# Patient Record
Sex: MALE | Race: WHITE | Employment: FULL TIME | URBAN - METROPOLITAN AREA
[De-identification: names, ages, dates, MRNs, and addresses within clinical notes are randomized per-mention and may not be internally consistent; named-entity substitution may affect disease eponyms.]

---

## 2017-10-02 ENCOUNTER — OFFICE VISIT (OUTPATIENT)
Dept: ORTHOPEDIC SURGERY | Age: 42
End: 2017-10-02

## 2017-10-02 VITALS
DIASTOLIC BLOOD PRESSURE: 76 MMHG | WEIGHT: 208 LBS | SYSTOLIC BLOOD PRESSURE: 133 MMHG | BODY MASS INDEX: 29.78 KG/M2 | HEIGHT: 70 IN

## 2017-10-02 DIAGNOSIS — M25.562 ACUTE PAIN OF LEFT KNEE: Primary | ICD-10-CM

## 2017-10-02 PROCEDURE — 99204 OFFICE O/P NEW MOD 45 MIN: CPT | Performed by: ORTHOPAEDIC SURGERY

## 2017-10-02 PROCEDURE — 20610 DRAIN/INJ JOINT/BURSA W/O US: CPT | Performed by: ORTHOPAEDIC SURGERY

## 2017-10-02 NOTE — PROGRESS NOTES
inches from the floor      Strength: Quadriceps testing 5/5 , hamstring muscle testing 5/5, EHL against resistance is 5/5, hip flexor strength is intact 5/5, internal and external rotation of the hip against resistance is also intact 5/5    Special Tests: stable Lachman, negative anterior drawer, negative pivot shift, no posterior sag no posterior drawer does not open to valgus or varus stress at 0 or 30° negative, Steinmann's negative, Pearl's negative, Homans negative Zachary, pedal pulses are +2/4 capillary refill is brisk sensation is intact ankle exam and hip exam are shows no pain with full range of motion provocative testing of the hip is nonpainful muscle testing around the hip is 5 over 5. Lumbar flexion to 6 inches from floor with out pain    Gait: antalgic     Reflex:    Lower extremity Deep tendon reflexes +2/4 and equal bilaterally for patella and Achilles  Upper extremity reflexes:  of the biceps, triceps, brachioradialis +2/4 equal bilaterally    Contralateral  Knee: Negative Lachman negative anterior drawer negative pivot shift no posterior sag no posterior drawer does not open to valgus or varus stress at 0 or 30° negative Steinmann's negative Pearl's negative Homans negative Zachary pedal pulses are +2/4 capillary refill is brisk sensation is intact ankle exam and hip exam are shows no pain with full range of motion provocative testing of the hip is nonpainful muscle testing around the hip is 5 over 5.       Lumbar exam:    L1: good strength of the iliopsoas muscle upper lateral thigh sensation is intact  L2: good strength of the iliopsoas muscle and medial epicondyle sensation is intact Lateral thigh sensation is intact  L3: good strength with out pain of obturator externus muscle sensation is intact to medial epicondyle of the femur   L4:Good quadratus strength and gluteus medius and minimus strength, sensation is intact to medial malleolus  L5:Intact Extensor hallucis and tibialis posterior

## 2017-10-16 ENCOUNTER — OFFICE VISIT (OUTPATIENT)
Dept: ORTHOPEDIC SURGERY | Age: 42
End: 2017-10-16

## 2017-10-16 VITALS — WEIGHT: 208 LBS | HEIGHT: 70 IN | BODY MASS INDEX: 29.78 KG/M2

## 2017-10-16 DIAGNOSIS — S83.232D COMPLEX TEAR OF MEDIAL MENISCUS OF LEFT KNEE AS CURRENT INJURY, SUBSEQUENT ENCOUNTER: Primary | ICD-10-CM

## 2017-10-16 PROBLEM — S83.232A COMPLEX TEAR OF MEDIAL MENISCUS OF LEFT KNEE AS CURRENT INJURY: Status: ACTIVE | Noted: 2017-10-16

## 2017-10-16 PROCEDURE — 99213 OFFICE O/P EST LOW 20 MIN: CPT | Performed by: ORTHOPAEDIC SURGERY

## 2017-10-16 NOTE — PROGRESS NOTES
History of Present Illness:  Everardo Shukla is a 38 y.o. male    Location recheck evaluation left Knee   Severity moderate  Duration several months now  Associated sign/symptoms pain mostly medial    Medical History  Patient's medications, allergies, past medical, surgical, social and family histories were reviewed and updated as appropriate. Review of Systems  Pertinent items are noted in HPI  Review of systems reviewed from Patient History Form dated on 10-2-17 and available in the patient's chart under the Media tab. No change in the patients medical history form. Examination:  General Exam:    Vitals: Height 5' 10\" (1.778 m), weight 208 lb (94.3 kg). Constitutional: Patient is adequately groomed with no evidence of malnutrition  Mental Status: The patient is alert and  oriented to time, place and person. The patient's mood and affect are appropriate. Lymphatic: The lymphatic examination bilaterally reveals all areas to be without enlargement or induration. Vascular: Examination reveals no swelling or calf tenderness. Peripheral pulses are palpable and 2+. Neurological: The patient has good coordination. There is no weakness or sensory deficit. Skin:    Head/Neck: inspection reveals no rashes, ulcerations or lesions. Trunk:  inspection reveals no rashes, ulcerations or lesions. Right Lower Extremity: inspection reveals no rashes, ulcerations or lesions. Left Lower Extremity: inspection reveals no rashes, ulcerations or lesions.                                           PHYSICAL EXAM:        Knee Examination  Inspection:  No abrasions no lacerations no signs of infection or obvious deformity moderate obvious  swelling and joint effusion     Palpation:   Palpation reveals severe  Pain medial joint line,   No lateral joint line pain,  moderate joint effusion    Range of Motion:  0 - 150° flexion/ extension   Hip extension to 20 hip flexion to 70+  Lumbar ROM -20 extension flexion to 6 inches or injury. Range of motion is unremarkable. There is no gross instability. There are no rashes, ulcerations or lesions. Strength and tone are normal.  Lower Back: Examination of the lower back does not show any tenderness, deformity or injury. Range of motion is unremarkable. There is no gross instability. There are no rashes, ulcerations or lesions. Strength and tone are normal.    Past Surgical History:   Procedure Laterality Date    CORONARY ANGIOPLASTY WITH STENT PLACEMENT      X 2    HERNIA REPAIR      X 2    VASECTOMY      VASECTOMY REVERSAL     . Assessment :  Left knee medial meniscus tear    Impression: Left knee medial meniscus tear    Office Procedures:  No orders of the defined types were placed in this encounter. Previous Treatments:  X-ray, MRI, injections,    Possible other diagnoses:      Treatment Plan:  I spent 15+ minutes, face to face, with the patient discussing and answering questions regarding the risks, benefits, and complications of arthroscopic knee surgery. The patient realizes that there are concerns with this surgery with respect to infection, deep vein thrombosis, neurological injury, delayed  rehabilitation, the possibility of arthrofibrosis of the knee, and specifically  Hoffa's fat pad fibrosis that can potentially cause difficulties. The patient realizes that there are also anesthetic concerns including cardiopulmonary issues, pulmonary issues, and even possibility of death or dystrophy. The patient voiced understanding to this as well as the normal  rehabilitation  that   is involved with weeks of physical therapy, exercise, and strengthening. The patient also realizes that even though the surgery, from a functional perspective, typically allows the patient to return to good function at about 6 weeks, that it often takes 6 months to completely rehabilitate from this operation.  The patient also realizes that if there is an arthritic component to the symptoms,

## 2017-11-09 ENCOUNTER — TELEPHONE (OUTPATIENT)
Dept: ORTHOPEDIC SURGERY | Age: 42
End: 2017-11-09

## 2017-11-15 DIAGNOSIS — S83.242D OTHER TEAR OF MEDIAL MENISCUS, CURRENT INJURY, LEFT KNEE, SUBSEQUENT ENCOUNTER: Primary | ICD-10-CM

## 2017-11-15 RX ORDER — HYDROCODONE BITARTRATE AND ACETAMINOPHEN 5; 325 MG/1; MG/1
1 TABLET ORAL EVERY 6 HOURS PRN
Qty: 40 TABLET | Refills: 0 | Status: SHIPPED | OUTPATIENT
Start: 2017-11-15 | End: 2017-11-22

## 2017-11-15 RX ORDER — MELOXICAM 15 MG/1
15 TABLET ORAL DAILY
Qty: 30 TABLET | Refills: 3 | Status: SHIPPED | OUTPATIENT
Start: 2017-11-15

## 2017-11-17 ENCOUNTER — TELEPHONE (OUTPATIENT)
Dept: ORTHOPEDIC SURGERY | Age: 42
End: 2017-11-17

## 2017-11-21 ENCOUNTER — HOSPITAL ENCOUNTER (OUTPATIENT)
Dept: SURGERY | Age: 42
Discharge: HOME | End: 2017-11-21
Attending: ORTHOPAEDIC SURGERY | Admitting: ORTHOPAEDIC SURGERY

## 2017-11-21 VITALS
BODY MASS INDEX: 32.8 KG/M2 | SYSTOLIC BLOOD PRESSURE: 128 MMHG | WEIGHT: 229.1 LBS | TEMPERATURE: 97.2 F | HEART RATE: 69 BPM | DIASTOLIC BLOOD PRESSURE: 92 MMHG | OXYGEN SATURATION: 100 % | RESPIRATION RATE: 19 BRPM | HEIGHT: 70 IN

## 2017-11-21 RX ORDER — SODIUM CHLORIDE 0.9 % (FLUSH) 0.9 %
10 SYRINGE (ML) INJECTION EVERY 12 HOURS SCHEDULED
Status: DISCONTINUED | OUTPATIENT
Start: 2017-11-21 | End: 2017-11-22 | Stop reason: HOSPADM

## 2017-11-21 RX ORDER — MEPERIDINE HYDROCHLORIDE 25 MG/ML
12.5 INJECTION INTRAMUSCULAR; INTRAVENOUS; SUBCUTANEOUS EVERY 5 MIN PRN
Status: DISCONTINUED | OUTPATIENT
Start: 2017-11-21 | End: 2017-11-22 | Stop reason: HOSPADM

## 2017-11-21 RX ORDER — OXYCODONE HYDROCHLORIDE 5 MG/1
5 TABLET ORAL PRN
Status: DISCONTINUED | OUTPATIENT
Start: 2017-11-21 | End: 2017-11-21

## 2017-11-21 RX ORDER — FENTANYL CITRATE 50 UG/ML
50 INJECTION, SOLUTION INTRAMUSCULAR; INTRAVENOUS EVERY 5 MIN PRN
Status: DISCONTINUED | OUTPATIENT
Start: 2017-11-21 | End: 2017-11-22 | Stop reason: HOSPADM

## 2017-11-21 RX ORDER — HYDROMORPHONE HCL 110MG/55ML
0.25 PATIENT CONTROLLED ANALGESIA SYRINGE INTRAVENOUS EVERY 5 MIN PRN
Status: DISCONTINUED | OUTPATIENT
Start: 2017-11-21 | End: 2017-11-22 | Stop reason: HOSPADM

## 2017-11-21 RX ORDER — SODIUM CHLORIDE 0.9 % (FLUSH) 0.9 %
10 SYRINGE (ML) INJECTION PRN
Status: DISCONTINUED | OUTPATIENT
Start: 2017-11-21 | End: 2017-11-22 | Stop reason: HOSPADM

## 2017-11-21 RX ORDER — HYDROMORPHONE HCL 110MG/55ML
PATIENT CONTROLLED ANALGESIA SYRINGE INTRAVENOUS
Status: COMPLETED
Start: 2017-11-21 | End: 2017-11-21

## 2017-11-21 RX ORDER — HYDROMORPHONE HCL 110MG/55ML
0.5 PATIENT CONTROLLED ANALGESIA SYRINGE INTRAVENOUS EVERY 5 MIN PRN
Status: DISCONTINUED | OUTPATIENT
Start: 2017-11-21 | End: 2017-11-22 | Stop reason: HOSPADM

## 2017-11-21 RX ORDER — HYDROCODONE BITARTRATE AND ACETAMINOPHEN 5; 325 MG/1; MG/1
1 TABLET ORAL
Status: COMPLETED | OUTPATIENT
Start: 2017-11-21 | End: 2017-11-21

## 2017-11-21 RX ORDER — DOCUSATE SODIUM 100 MG/1
100 CAPSULE, LIQUID FILLED ORAL 2 TIMES DAILY
Status: DISCONTINUED | OUTPATIENT
Start: 2017-11-21 | End: 2017-11-22 | Stop reason: HOSPADM

## 2017-11-21 RX ORDER — LABETALOL HYDROCHLORIDE 5 MG/ML
5 INJECTION, SOLUTION INTRAVENOUS EVERY 10 MIN PRN
Status: DISCONTINUED | OUTPATIENT
Start: 2017-11-21 | End: 2017-11-22 | Stop reason: HOSPADM

## 2017-11-21 RX ORDER — SODIUM CHLORIDE 9 MG/ML
INJECTION, SOLUTION INTRAVENOUS CONTINUOUS
Status: DISCONTINUED | OUTPATIENT
Start: 2017-11-21 | End: 2017-11-22 | Stop reason: HOSPADM

## 2017-11-21 RX ORDER — PROMETHAZINE HYDROCHLORIDE 25 MG/ML
6.25 INJECTION, SOLUTION INTRAMUSCULAR; INTRAVENOUS PRN
Status: DISCONTINUED | OUTPATIENT
Start: 2017-11-21 | End: 2017-11-22 | Stop reason: HOSPADM

## 2017-11-21 RX ORDER — CEFAZOLIN SODIUM 2 G/100ML
2 INJECTION, SOLUTION INTRAVENOUS
Status: COMPLETED | OUTPATIENT
Start: 2017-11-21 | End: 2017-11-21

## 2017-11-21 RX ORDER — ACETAMINOPHEN 325 MG/1
650 TABLET ORAL EVERY 4 HOURS PRN
Status: DISCONTINUED | OUTPATIENT
Start: 2017-11-21 | End: 2017-11-22 | Stop reason: HOSPADM

## 2017-11-21 RX ORDER — DIPHENHYDRAMINE HYDROCHLORIDE 50 MG/ML
12.5 INJECTION INTRAMUSCULAR; INTRAVENOUS
Status: ACTIVE | OUTPATIENT
Start: 2017-11-21 | End: 2017-11-21

## 2017-11-21 RX ORDER — ONDANSETRON 2 MG/ML
4 INJECTION INTRAMUSCULAR; INTRAVENOUS EVERY 6 HOURS PRN
Status: DISCONTINUED | OUTPATIENT
Start: 2017-11-21 | End: 2017-11-22 | Stop reason: HOSPADM

## 2017-11-21 RX ORDER — OXYCODONE HYDROCHLORIDE 5 MG/1
10 TABLET ORAL PRN
Status: DISCONTINUED | OUTPATIENT
Start: 2017-11-21 | End: 2017-11-21

## 2017-11-21 RX ADMIN — SODIUM CHLORIDE: 9 INJECTION, SOLUTION INTRAVENOUS at 08:00

## 2017-11-21 RX ADMIN — CEFAZOLIN SODIUM 2 G: 2 INJECTION, SOLUTION INTRAVENOUS at 08:48

## 2017-11-21 RX ADMIN — Medication 0.5 MG: at 09:42

## 2017-11-21 RX ADMIN — Medication 0.5 MG: at 09:34

## 2017-11-21 RX ADMIN — HYDROCODONE BITARTRATE AND ACETAMINOPHEN 1 TABLET: 5; 325 TABLET ORAL at 10:05

## 2017-11-21 ASSESSMENT — PAIN DESCRIPTION - DESCRIPTORS: DESCRIPTORS: ACHING;CONSTANT

## 2017-11-21 ASSESSMENT — PAIN - FUNCTIONAL ASSESSMENT: PAIN_FUNCTIONAL_ASSESSMENT: 0-10

## 2017-11-21 ASSESSMENT — ENCOUNTER SYMPTOMS: SHORTNESS OF BREATH: 0

## 2017-11-21 ASSESSMENT — PAIN SCALES - GENERAL
PAINLEVEL_OUTOF10: 7
PAINLEVEL_OUTOF10: 4
PAINLEVEL_OUTOF10: 7

## 2017-11-21 NOTE — OP NOTE
[facility]       [address] [facility], [address] [place] [address]       Operative note by  Melissa Tristan. Deshawn Bunn MS, DO  Orthopedic surgeon  Orthopedics sports Fellowship trained  Board-certified  Team Physician for [facility]                       Knee Arthroscopy      Patient Name:  Carol Hanley    YOB: 1975    Medical  Record number: [id number]    Account number: [de-identified]     Date Of Surgery: 11/21/2017    Date Of Dictation: 11/21/2017    Location: [address]    Surgeon: Dr. Stacie Walton    Assistant: None    Anesthesia: Local with General    Indications:  Chronic pain not alleviated by conservative therapy, positive MRI, not improved with conservative care    Complications: None    Estimated blood loss: Minimal    Preoperative antibiotics: Given and documented in the chart        Preoperative diagnosis :  1.  Left knee medial meniscus tear          Postoperative diagnosis :  1.  Left knee medial meniscus tear  2. Left knee hyperemic hypertrophic synovitis  3. 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 Left knee diagnostic arthroscopy  2. Left knee partial medial meniscectomy  3. Left knee extensive hyperemic hypertrophic synovectomy  4. Left knee arthroscopic chondroplasty of the medial femoral condyle           Procedure in detail:  Patient was seen and evaluated A history and physical was obtained a written consent was discussed and signed by the patient. Following the anesthesia evaluation and discussion with the patient about the scheduled procedure and recovery along with postoperative follow-up plans the patient was brought back to the operative suite put on the operative table in the supine position. At this point the patient was given a MAC anesthetic.   I using the shaver I remove the synovial tissue from the superior patellar pouch and then used the bipolar to cauterize any bleeding tissue. From the same portal I remove the synovium from the medial gutter patella trochlear area the anterior medial joint space part of the anterior lateral joint space in the intertrochlear area using both the shaver and the bipolar. I now changed the camera to the medial portal using the shaver in the lateral portal I remove the remainder of the synovium from the anterior joint space the anterior lateral joint space in the lateral gutter all the way back up to the superior patellar pouch and across into the patella trochlear area once this was all excised we used the bipolar to smooth off any tissue and cauterize any bleeding tissue. The chondral surface had loose hanging debris. A chondroplasty was performed. The chondral surface was was trimmed with a shaver down to a stable surface and then I used a bipolar to smooth off the edges. Following the entire procedure as soft tissue was cauterized of any bleeding the posterior joint space the medial and lateral gutter were evaluated for any loose pieces or loose bodies. Following the entire procedure or instruments were removed including the camera and cannula. One simple interrupted suture was put in each portal then they were covered with sterile Band-Aids sterile 4 x 4's and a  Double 6 inch Ace bandage was applied. The patient was brought to recovery in stable condition and typed written instructions, pain medication, a phone number to call if there is any concerns or problems, an appointment for follow-up.                         _____________________         Melissa Tristan.  MS Crystal, DO         Orthopedic Surgeon          [employer]ics [employer] trained         Board-certified         Team Physician for [employer]

## 2017-11-21 NOTE — PROGRESS NOTES
Pt and wife given discharge instructions, verbalized understanding. IV taken out, site wnl. Pt home with wife in stable condition with all belongings.      Electronically signed by Katherine Cardoso RN, BSN on 11/21/17 at 8629

## 2017-11-21 NOTE — PROGRESS NOTES
Pt arrived from OR to PACU via cart in stable condition. Respirations easy, equal, and unlabored, sats 100% on RA. Large ACE wrap to LLE. +3 pedal pulse palpable in left foot, pt able to move left toes on command. Reports pain 7/10 in left operative knee. VSS. Report obtained from [address] [place] [address].. Will continue to monitor.      Electronically signed by Oanh Delgado RN, BSN on 11/21/17 at 0584

## 2017-11-21 NOTE — PROGRESS NOTES
Pt reports pain now 4/10 and tolerable. VSS. Tolerating water and crackers. Pt has met criteria to be discharged from PACU phase 1, will prepare for discharge home in phase 2.      Electronically signed by Vida Garcia RN, BSN on 11/21/17 at 0358

## 2017-11-21 NOTE — ANESTHESIA PRE-OP
atorvastatin (LIPITOR) 80 MG tablet Take 80 mg by mouth nightly       metoprolol succinate (TOPROL XL) 25 MG extended release tablet Take 25 mg by mouth nightly       valsartan-hydrochlorothiazide (DIOVAN-HCT) 320-12.5 MG per tablet nightly       aspirin 81 MG chewable tablet Take 81 mg by mouth      meloxicam (MOBIC) 15 MG tablet Take 1 tablet by mouth daily 30 tablet 3    HYDROcodone-acetaminophen (NORCO) 5-325 MG per tablet Take 1 tablet by mouth every 6 hours as needed for Pain .  40 tablet 0    nitroGLYCERIN (NITROSTAT) 0.4 MG SL tablet Place 0.4 mg under the tongue       Current Facility-Administered Medications   Medication Dose Route Frequency Provider Last Rate Last Dose    ceFAZolin (ANCEF) 2 g in dextrose 4 % 100 mL IVPB (premix)  2 g Intravenous On Call to DO Trena        0.9 % sodium chloride infusion   Intravenous Continuous Chung Paul MD        sodium chloride flush 0.9 % injection 10 mL  10 mL Intravenous 2 times per day Chung Paul MD        sodium chloride flush 0.9 % injection 10 mL  10 mL Intravenous PRN Chung Paul MD        HYDROmorphone (DILAUDID) injection 0.25 mg  0.25 mg Intravenous Q5 Min PRN Chung Paul MD        fentaNYL (SUBLIMAZE) injection 50 mcg  50 mcg Intravenous Q5 Min PRN Chung Paul MD        HYDROmorphone (DILAUDID) injection 0.25 mg  0.25 mg Intravenous Q5 Min PRN Chung Paul MD        HYDROmorphone (DILAUDID) injection 0.5 mg  0.5 mg Intravenous Q5 Min PRN Chung Paul MD        oxyCODONE (ROXICODONE) immediate release tablet 5 mg  5 mg Oral PRN Chung Paul MD        Or    oxyCODONE (ROXICODONE) immediate release tablet 10 mg  10 mg Oral PRN Chung Paul MD        diphenhydrAMINE (BENADRYL) injection 12.5 mg  12.5 mg Intravenous Once PRN Chung Palu MD        promethazine (PHENERGAN) injection 6.25 mg  6.25 mg Intravenous PRN Chung Paul MD        labetalol (NORMODYNE;TRANDATE) injection 5 mg  5 mg past MI, CABG/stent, dysrhythmias,  angina,  CHF, orthopnea, PND and  ANDREA      Rhythm: regular  Rate: normal                    Neuro/Psych:      (-) seizures, neuromuscular disease, TIA, CVA, headaches and psychiatric history           GI/Hepatic/Renal:        (-) hiatal hernia, GERD, PUD, hepatitis, liver disease and bowel prep       Endo/Other:        (-) hypothyroidism, hyperthyroidism, blood dyscrasia, arthritis, no Type II DM, no Type I DM               Abdominal:           Vascular:                                        Anesthesia Plan      general     ASA 3       Induction: intravenous. Anesthetic plan and risks discussed with patient. Plan discussed with CRNA. DOS STAFF ADDENDUM:    Pt seen and examined, chart reviewed (including anesthesia, drug and allergy history). No interval changes to history and physical examination. Anesthetic plan, risks, benefits, alternatives, and personnel involved discussed with patient. Patient verbalized an understanding and agrees to proceed.       Marvin House MD  November 21, 2017  7:29 AM

## 2017-11-27 ENCOUNTER — TELEPHONE (OUTPATIENT)
Dept: ORTHOPEDIC SURGERY | Age: 42
End: 2017-11-27

## 2017-11-27 ENCOUNTER — OFFICE VISIT (OUTPATIENT)
Dept: ORTHOPEDIC SURGERY | Age: 42
End: 2017-11-27

## 2017-11-27 DIAGNOSIS — S83.232D COMPLEX TEAR OF MEDIAL MENISCUS OF LEFT KNEE AS CURRENT INJURY, SUBSEQUENT ENCOUNTER: Primary | ICD-10-CM

## 2017-11-27 PROCEDURE — 99024 POSTOP FOLLOW-UP VISIT: CPT | Performed by: ORTHOPAEDIC SURGERY

## 2017-11-29 ENCOUNTER — HOSPITAL ENCOUNTER (OUTPATIENT)
Dept: PHYSICAL THERAPY | Age: 42
Discharge: HOME | End: 2017-11-30
Admitting: ORTHOPAEDIC SURGERY

## 2017-11-29 DIAGNOSIS — S83.232D COMPLEX TEAR OF MEDIAL MENISCUS OF LEFT KNEE AS CURRENT INJURY, SUBSEQUENT ENCOUNTER: ICD-10-CM

## 2017-11-29 PROCEDURE — L1845 KO DOUBLE UPRIGHT PRE CST: HCPCS | Performed by: ORTHOPAEDIC SURGERY

## 2017-11-29 NOTE — PLAN OF CARE
[place] [address]  Phone [phone]   Fax [phone]                                                       Physical Therapy Certification    Dear Referring Practitioner: Park Arriaga DO,    We had the pleasure of evaluating the following patient for physical therapy services at [address] [place] [address]. A summary of our findings can be found in the initial assessment below. This includes our plan of care. If you have any questions or concerns regarding these findings, please do not hesitate to contact me at the office phone number checked above. Thank you for the referral.       Physician Signature:_______________________________Date:__________________  By signing above (or electronic signature), therapists plan is approved by physician      Patient: Preet Wynn   : 1975   MRN: [id number]  Referring Physician: Referring Practitioner: Park Arriaga DO      Evaluation Date: 2017      Medical Diagnosis Information:  Diagnosis: s/p left knee medial meniscectomy on 2017 S83.232D   Treatment Diagnosis: L knee pain and weakness                                         Insurance information: PT Insurance Information: [employer], $500 deductible met no copay, 30 OP visits     Precautions/ Contra-indications:   Latex Allergy:  [x]NO      []YES  Preferred Language for Healthcare:   [x]English       []other:    SUBJECTIVE: Patient states his knee has been quite painful since his surgery. No relief with pain medications and cannot take anti-inflammatories. Pt notes constant pain in the knee with occasional sharp shooting pains. Pain located over medial and posterior knee joint lines.      Relevant Medical History: h/o left knee scope  Functional Disability Index:PT G-Codes  Functional Assessment Tool Used: LEFS  Score: 70% allergies, meds reviewed. Medical chart reviewed. See intake form. Review Of Systems (ROS):  [x]Performed Review of systems (Integumentary, CardioPulmonary, Neurological) by intake and observation. Intake form has been scanned into medical record. Patient has been instructed to contact their primary care physician regarding ROS issues if not already being addressed at this time. Co-morbidities/Complexities (which will affect course of rehabilitation):   []None           Arthritic conditions   []Rheumatoid arthritis (M05.9)  []Osteoarthritis (M19.91)   Cardiovascular conditions   [x]Hypertension (I10)  []Hyperlipidemia (E78.5)  []Angina pectoris (I20)  []Atherosclerosis (I70)   Musculoskeletal conditions   []Disc pathology   []Congenital spine pathologies   []Prior surgical intervention  []Osteoporosis (M81.8)  []Osteopenia (M85.8)   Endocrine conditions   []Hypothyroid (E03.9)  []Hyperthyroid Gastrointestinal conditions   []Constipation (T02.37)   Metabolic conditions   []Morbid obesity (E66.01)  []Diabetes type 1(E10.65) or 2 (E11.65)   []Neuropathy (G60.9)     Pulmonary conditions   []Asthma (J45)  []Coughing   []COPD (J44.9)   Psychological Disorders  []Anxiety (F41.9)  []Depression (F32.9)   []Other:   []Other:          Barriers to/and or personal factors that will affect rehab potential:              []Age  []Sex              []Motivation/Lack of Motivation                        []Co-Morbidities              []Cognitive Function, education/learning barriers              []Environmental, home barriers              []profession/work barriers  []past PT/medical experience  []other:  Justification:     Falls Risk Assessment (30 days):   [x] Falls Risk assessed and no intervention required.   [] Falls Risk assessed and Patient requires intervention due to being higher risk   TUG score (>12s at risk):     [] Falls education provided, including       G-Codes:  PT G-Codes  Functional Assessment Tool Used: LEFS  Score: 70% disability  Functional Limitation: Mobility: Walking and moving around  Mobility: Walking and Moving Around Current Status (): At least 60 percent but less than 80 percent impaired, limited or restricted  Mobility: Walking and Moving Around Goal Status (): At least 1 percent but less than 20 percent impaired, limited or restricted    ASSESSMENT: Tulio Sanches is a 37 y/o male presenting one week s/p left knee scope with medial meniscectomy. Functional Impairments:     []Noted lumbar/proximal hip/LE joint hypomobility   [x]Decreased LE functional ROM   [x]Decreased core/proximal hip strength and neuromuscular control   [x]Decreased LE functional strength   [x]Reduced balance/proprioceptive control   []other:      Functional Activity Limitations (from functional questionnaire and intake)   [x]Reduced ability to tolerate prolonged functional positions   [x]Reduced ability or difficulty with changes of positions or transfers between positions   [x]Reduced ability to maintain good posture and demonstrate good body mechanics with sitting, bending, and lifting   [x]Reduced ability to sleep   [x] Reduced ability or tolerance with driving and/or computer work   [x]Reduced ability to perform lifting, carrying tasks   [x]Reduced ability to squat   []Reduced ability to forward bend   [x]Reduced ability to ambulate prolonged functional periods/distances/surfaces   [x]Reduced ability to ascend/descend stairs   [x]Reduced ability to run, hop, cut or jump   []other:    Participation Restrictions   []Reduced participation in self care activities   [x]Reduced participation in home management activities   [x]Reduced participation in work activities   []Reduced participation in social activities. [x]Reduced participation in sport/recreation activities. Classification :    [x]Signs/symptoms consistent with post-surgical status including decreased ROM, strength and function.    []Signs/symptoms consistent with joint sprain/strain   []Signs/symptoms consistent with patella-femoral syndrome   []Signs/symptoms consistent with knee OA/hip OA   []Signs/symptoms consistent with internal derangement of knee/Hip   []Signs/symptoms consistent with functional hip weakness/NMR control      []Signs/symptoms consistent with tendinitis/tendinosis    []signs/symptoms consistent with pathology which may benefit from Dry needling      []other:      Prognosis/Rehab Potential:      [x]Excellent   []Good    []Fair   []Poor    Tolerance of evaluation/treatment:    []Excellent   [x]Good    []Fair   []Poor    Physical Therapy Evaluation Complexity Justification  [x] A history of present problem with:  [] no personal factors and/or comorbidities that impact the plan of care;  [x]1-2 personal factors and/or comorbidities that impact the plan of care  []3 personal factors and/or comorbidities that impact the plan of care  [x] An examination of body systems using standardized tests and measures addressing any of the following: body structures and functions (impairments), activity limitations, and/or participation restrictions;:  [x] a total of 1-2 or more elements   [] a total of 3 or more elements   [] a total of 4 or more elements   [x] A clinical presentation with:  [x] stable and/or uncomplicated characteristics   [] evolving clinical presentation with changing characteristics  [] unstable and unpredictable characteristics;   [x] Clinical decision making of [x] low, [] moderate, [] high complexity using standardized patient assessment instrument and/or measurable assessment of functional outcome.     [x] EVAL (LOW) 90941 (typically 30 minutes face-to-face)  [] EVAL (MOD) 00047 (typically 30 minutes face-to-face)  [] EVAL (HIGH) 20161 (typically 45 minutes face-to-face)  [] RE-EVAL     PLAN:   Frequency/Duration:  2 days per week for 4-6 Weeks:  Interventions:  [x]  Therapeutic exercise including: strength training, ROM, for Lower extremity and

## 2017-11-29 NOTE — FLOWSHEET NOTE
Lao/Biofeedback 10/10       G. Med activaiton/sidelying       G. Max Activation/prone       Hip Ext full ROM G. Activation       Bosu Bal and Prop- G Med       Single leg stance/Balance/Prop       Bosu Retro G. Med act                         Therapeutic Exercise and NMR EXR  [] (81170) Provided verbal/tactile cueing for activities related to strengthening, flexibility, endurance, ROM for improvements in LE, proximal hip, and core control with self care, mobility, lifting, ambulation.  [] (85309) Provided verbal/tactile cueing for activities related to improving balance, coordination, kinesthetic sense, posture, motor skill, proprioception  to assist with LE, proximal hip, and core control in self care, mobility, lifting, ambulation and eccentric single leg control.      NMR and Therapeutic Activities:    [] (65911 or 24960) Provided verbal/tactile cueing for activities related to improving balance, coordination, kinesthetic sense, posture, motor skill, proprioception and motor activation to allow for proper function of core, proximal hip and LE with self care and ADLs  [] (71386) Gait Re-education- Provided training and instruction to the patient for proper LE, core and proximal hip recruitment and positioning and eccentric body weight control with ambulation re-education including up and down stairs     Home Exercise Program:    [x] (27419) Reviewed/Progressed HEP activities related to strengthening, flexibility, endurance, ROM of core, proximal hip and LE for functional self-care, mobility, lifting and ambulation/stair navigation   [] (19272)Reviewed/Progressed HEP activities related to improving balance, coordination, kinesthetic sense, posture, motor skill, proprioception of core, proximal hip and LE for self care, mobility, lifting, and ambulation/stair navigation      Manual Treatments:  PROM / STM / Oscillations-Mobs:  G-I, II, III, IV (PA's, Inf., Post.)  [x] (20288) Provided manual therapy to mobilize

## 2017-12-01 ENCOUNTER — HOSPITAL ENCOUNTER (OUTPATIENT)
Dept: PHYSICAL THERAPY | Age: 42
Discharge: HOME | End: 2017-12-31
Attending: ORTHOPAEDIC SURGERY | Admitting: ORTHOPAEDIC SURGERY

## 2017-12-01 ENCOUNTER — HOSPITAL ENCOUNTER (OUTPATIENT)
Dept: PHYSICAL THERAPY | Age: 42
Discharge: HOME | End: 2017-12-01
Admitting: ORTHOPAEDIC SURGERY

## 2017-12-01 NOTE — FLOWSHEET NOTE
flip  [x] Patient limited by pain  [] Patient limited by other medical complications  [] Other:     Prognosis: [x] Good [] Fair  [] Poor    Patient Requires Follow-up: [x] Yes  [] No    PLAN:   [] Continue per plan of care [] Alter current plan (see comments)  [x] Plan of care initiated [] Hold pending MD visit [] Discharge    Electronically signed by:  Rohan Hanson PT

## 2017-12-08 ENCOUNTER — HOSPITAL ENCOUNTER (OUTPATIENT)
Dept: PHYSICAL THERAPY | Age: 42
Discharge: HOME | End: 2017-12-08
Admitting: ORTHOPAEDIC SURGERY

## 2017-12-08 NOTE — FLOWSHEET NOTE
allowing for proper ROM for normal function with self care, mobility, lifting and ambulation. Modalities:  Cold pack with TENS x 10'    Charges:  Timed Code Treatment Minutes: 40   Total Treatment Minutes: 50     [] EVAL  [x] SH(21376) x  2   [] IONTO  [] NMR (37576) x      [] VASO  [x] Manual (36774) x  1    [] Other:  [] TA x       [] Mech Traction (27386)  [] ES(attended) (61236)      [x] ES (un) (53881):     GOALS:   Patient stated goal: less pain, return to exercising, walking without pain    Therapist goals for Patient:   Short Term Goals: To be achieved in: 2 weeks  1. Independent in HEP and progression per patient tolerance, in order to prevent re-injury. 2. Patient will have a decrease in pain to facilitate improvement in movement, function, and ADLs as indicated by Functional Deficits. Long Term Goals: To be achieved in: 6 weeks  1. Disability index score of 10% or less for the LEFS to assist with reaching prior level of function. 2. Patient will demonstrate increased AROM to WNL to allow for proper joint functioning as indicated by patients Functional Deficits. 3. Patient will demonstrate an increase in Strength to good proximal hip strength and control, within 5lb HHD in LE to allow for proper functional mobility as indicated by patients Functional Deficits. 4. Patient will negotiate one flight of stairs without increased symptoms or restriction. 5. Pt will tolerate biking x 20 min       Progression Towards Functional goals:  [x] Patient is progressing as expected towards functional goals listed. [] Progression is slowed due to complexities listed. [] Progression has been slowed due to co-morbidities. [] Plan just implemented, too soon to assess goals progression  [] Other:     ASSESSMENT: Much improved knee AROM and exercise tolerance today. Some knee pain with prone knee ext but able to complete 20 reps with cues to decrease stretch on rectus femoris.      Treatment/Activity

## 2017-12-13 ENCOUNTER — TELEPHONE (OUTPATIENT)
Dept: ORTHOPEDIC SURGERY | Age: 42
End: 2017-12-13

## 2017-12-18 ENCOUNTER — HOSPITAL ENCOUNTER (OUTPATIENT)
Dept: PHYSICAL THERAPY | Age: 42
Discharge: HOME | End: 2017-12-18
Admitting: ORTHOPAEDIC SURGERY

## 2017-12-18 NOTE — FLOWSHEET NOTE
[facility] , [facility]    Physical Therapy Daily Treatment Note  Date:  2017    Patient Name:  Joe Hill    :  1975  MRN: [id number]  Restrictions/Precautions:    Medical/Treatment Diagnosis Information:  · Diagnosis: s/p left knee medial meniscectomy on 2017 N04.953S  · Treatment Diagnosis: L knee pain and weakness  Insurance/Certification information:  PT Insurance Information: [employer], $500 deductible met no copay, 30 OP visits  Physician Information:  Referring Practitioner: Serena Lopez DO  Plan of care signed (Y/N):     Date of Patient follow up with Physician:     G-Code (if applicable):      Date G-Code Applied:         Progress Note: [x]  Yes  []  No  Next due by: Visit #10       Latex Allergy:  [x]NO      []YES  Preferred Language for Healthcare:   [x]English       []other:    Visit # Insurance Allowable   3 30     Pain level:1-2/10 VAS       SUBJECTIVE:  Pt notes that he was OOT all last week and was able to see a PT 3x that week he was gone. He notes continues soreness at the end of the day along with swelling, but he is able to negotiate stairs reciprocally now. OBJECTIVE:   Observation:   Test measurements:      RESTRICTIONS/PRECAUTIONS:     Exercises/Interventions:     Therapeutic Ex Resistance Sets/sec Reps Notes   BIKE  6'     Quad sets  2 10    VMO quad sets  2 10    Heel slides  5\" 3 10 Discharge npv   HS strap stretch  30\" 2    gastroc strap stretch  30\" 2    SLR 3 10    SL hip abduction 3 10    Bridging3 10 With ball squeeze          Prone donkey kick  2 10    TKE blue 3 10                                              Manual Intervention 15'       Knee mobs/PROM 2'      Tib/Fem Mobs       Patella Mobs       Knee distraction short sitting 3'      Ankle mobs       STM Medial KJL, patellar tendon CFM 5'             NMR re-education       [place]an/Biofeedback 10/10       G. Med activaiton/sidelying       G.  Max modulating pain, promoting relaxation,  increasing ROM, reducing/eliminating soft tissue swelling/inflammation/restriction, improving soft tissue extensibility and allowing for proper ROM for normal function with self care, mobility, lifting and ambulation. Modalities:  Cold pack with TENS x 10'    Charges:  Timed Code Treatment Minutes: 50   Total Treatment Minutes: 60     [] EVAL  [x] EL(94269) x  2   [] IONTO  [] NMR (52452) x      [] VASO  [x] Manual (07438) x  1    [] Other:  [] TA x       [] Mech Traction (07122)  [] ES(attended) (10813)      [x] ES (un) (05703):     GOALS:   Patient stated goal: less pain, return to exercising, walking without pain    Therapist goals for Patient:   Short Term Goals: To be achieved in: 2 weeks  1. Independent in HEP and progression per patient tolerance, in order to prevent re-injury. 2. Patient will have a decrease in pain to facilitate improvement in movement, function, and ADLs as indicated by Functional Deficits. Long Term Goals: To be achieved in: 6 weeks  1. Disability index score of 10% or less for the LEFS to assist with reaching prior level of function. 2. Patient will demonstrate increased AROM to WNL to allow for proper joint functioning as indicated by patients Functional Deficits. 3. Patient will demonstrate an increase in Strength to good proximal hip strength and control, within 5lb HHD in LE to allow for proper functional mobility as indicated by patients Functional Deficits. 4. Patient will negotiate one flight of stairs without increased symptoms or restriction. 5. Pt will tolerate biking x 20 min       Progression Towards Functional goals:  [x] Patient is progressing as expected towards functional goals listed. [] Progression is slowed due to complexities listed. [] Progression has been slowed due to co-morbidities.   [] Plan just implemented, too soon to assess goals progression  [] Other:     ASSESSMENT: Pt continues to demo poor gait

## 2017-12-22 ENCOUNTER — HOSPITAL ENCOUNTER (OUTPATIENT)
Dept: PHYSICAL THERAPY | Age: 42
Discharge: HOME | End: 2017-12-22
Admitting: ORTHOPAEDIC SURGERY

## 2017-12-22 NOTE — FLOWSHEET NOTE
stance/Balance/Prop       Bosu Retro G. Med act                         Therapeutic Exercise and NMR EXR  [x] (24206) Provided verbal/tactile cueing for activities related to strengthening, flexibility, endurance, ROM for improvements in LE, proximal hip, and core control with self care, mobility, lifting, ambulation.  [] (48051) Provided verbal/tactile cueing for activities related to improving balance, coordination, kinesthetic sense, posture, motor skill, proprioception  to assist with LE, proximal hip, and core control in self care, mobility, lifting, ambulation and eccentric single leg control.      NMR and Therapeutic Activities:    [] (54883 or 65272) Provided verbal/tactile cueing for activities related to improving balance, coordination, kinesthetic sense, posture, motor skill, proprioception and motor activation to allow for proper function of core, proximal hip and LE with self care and ADLs  [] (88316) Gait Re-education- Provided training and instruction to the patient for proper LE, core and proximal hip recruitment and positioning and eccentric body weight control with ambulation re-education including up and down stairs     Home Exercise Program:    [x] (23884) Reviewed/Progressed HEP activities related to strengthening, flexibility, endurance, ROM of core, proximal hip and LE for functional self-care, mobility, lifting and ambulation/stair navigation   [] (84697)Reviewed/Progressed HEP activities related to improving balance, coordination, kinesthetic sense, posture, motor skill, proprioception of core, proximal hip and LE for self care, mobility, lifting, and ambulation/stair navigation      Manual Treatments:  PROM / STM / Oscillations-Mobs:  G-I, II, III, IV (PA's, Inf., Post.)  [x] (63123) Provided manual therapy to mobilize LE, proximal hip and/or LS spine soft tissue/joints for the purpose of modulating pain, promoting relaxation,  increasing ROM, reducing/eliminating soft tissue

## 2017-12-27 ENCOUNTER — HOSPITAL ENCOUNTER (OUTPATIENT)
Dept: PHYSICAL THERAPY | Age: 42
Discharge: HOME | End: 2017-12-27
Admitting: ORTHOPAEDIC SURGERY

## 2017-12-27 NOTE — FLOWSHEET NOTE
[facility] , [facility]    Physical Therapy Daily Treatment Note  Date:  2017    Patient Name:  Sabina Garcia    :  1975  MRN: [id number]  Restrictions/Precautions:    Medical/Treatment Diagnosis Information:  · Diagnosis: s/p left knee medial meniscectomy on 2017 L39.744Z  · Treatment Diagnosis: L knee pain and weakness  Insurance/Certification information:  PT Insurance Information: [employer], $500 deductible met no copay, 30 OP visits  Physician Information:  Referring Practitioner: Jessica Armstrong DO  Plan of care signed (Y/N):     Date of Patient follow up with Physician:     G-Code (if applicable):      Date G-Code Applied:         Progress Note: [x]  Yes  []  No  Next due by: Visit #10       Latex Allergy:  [x]NO      []YES  Preferred Language for Healthcare:   [x]English       []other:    Visit # Insurance Allowable   5 30     Pain level: 1-2/10 VAS    SUBJECTIVE:  Having a hard time getting his knee all the way straight like his uninvolved knee. OBJECTIVE:   Observation:   Test measurements:      RESTRICTIONS/PRECAUTIONS:     Exercises/Interventions:     Therapeutic Ex Resistance Sets/sec Reps Notes   BIKE  6'           HS strap stretch  30\" 2    gastroc  / soleus strap stretch  30\" 2 Slant board   SLR 1# 3 10    SL hip abduction 1# 3 10    Bridging3 10 With ball squeeze          Prone donkey kick 1# 2 10    TKE blue 3 10    Eccentric leg press 60# 3 10    Step ups, fwd, lateral 6\" 2 10    Calf raises  3 10    SLS airex  30\" 3                  Manual Intervention 10'       Knee mobs/PROM 3'      Tib/Fem Mobs       Patella Mobs       Knee distraction short sitting      Ankle mobs       IA STM 5'   Hamstring, calf   Patellar taping 5'      NMR re-education       [place]uan/Biofeedback 10/10       G. Med activaiton/sidelying       G. Max Activation/prone       Hip Ext full ROM G.  Activation       Bosu Bal and Prop- G Med       Single leg swelling/inflammation/restriction, improving soft tissue extensibility and allowing for proper ROM for normal function with self care, mobility, lifting and ambulation. Modalities:  Cold pack with TENS x 10'    Charges:  Timed Code Treatment Minutes: 55   Total Treatment Minutes: 65     [] EVAL  [x] BH(82849) x  3   [] IONTO  [] NMR (80332) x      [] VASO  [x] Manual (63183) x  1    [] Other:  [] TA x       [] Mech Traction (79336)  [] ES(attended) (08705)      [x] ES (un) (84795):     GOALS:   Patient stated goal: less pain, return to exercising, walking without pain    Therapist goals for Patient:   Short Term Goals: To be achieved in: 2 weeks  1. Independent in HEP and progression per patient tolerance, in order to prevent re-injury. 2. Patient will have a decrease in pain to facilitate improvement in movement, function, and ADLs as indicated by Functional Deficits. Long Term Goals: To be achieved in: 6 weeks  1. Disability index score of 10% or less for the LEFS to assist with reaching prior level of function. 2. Patient will demonstrate increased AROM to WNL to allow for proper joint functioning as indicated by patients Functional Deficits. 3. Patient will demonstrate an increase in Strength to good proximal hip strength and control, within 5lb HHD in LE to allow for proper functional mobility as indicated by patients Functional Deficits. 4. Patient will negotiate one flight of stairs without increased symptoms or restriction. 5. Pt will tolerate biking x 20 min       Progression Towards Functional goals:  [x] Patient is progressing as expected towards functional goals listed. [] Progression is slowed due to complexities listed. [] Progression has been slowed due to co-morbidities. [] Plan just implemented, too soon to assess goals progression  [] Other:     ASSESSMENT: pt with patellar tendon tenderness most likely secondary to limping at end of day and lack of TKE.   He was taped with good verbalization of pain relief. Pt progressing with strength as expected. Treatment/Activity Tolerance:  [x] Patient tolerated treatment well [] Patient limited by fatique  [] Patient limited by pain  [] Patient limited by other medical complications  [] Other:     Prognosis: [x] Good [] Fair  [] Poor    Patient Requires Follow-up: [x] Yes  [] No    PLAN:   [] Continue per plan of care [] Alter current plan (see comments)  [x] Plan of care initiated [] Hold pending MD visit [] Discharge    Electronically signed by:  Tamy Devlin PT

## 2017-12-29 ENCOUNTER — HOSPITAL ENCOUNTER (OUTPATIENT)
Dept: PHYSICAL THERAPY | Age: 42
Discharge: HOME | End: 2017-12-29
Admitting: ORTHOPAEDIC SURGERY

## 2017-12-29 NOTE — FLOWSHEET NOTE
[facility]ij[facility] , [facility]    Physical Therapy Daily Treatment Note  Date:  2017    Patient Name:  Stephanie Monk    :  1975  MRN: [id number]  Restrictions/Precautions:    Medical/Treatment Diagnosis Information:  · Diagnosis: s/p left knee medial meniscectomy on 2017 T80.015W  · Treatment Diagnosis: L knee pain and weakness  Insurance/Certification information:  PT Insurance Information: [employer], $500 deductible met no copay, 30 OP visits  Physician Information:  Referring Practitioner: Ana Ventura DO  Plan of care signed (Y/N):     Date of Patient follow up with Physician:     G-Code (if applicable):      Date G-Code Applied:         Progress Note: [x]  Yes  []  No  Next due by: Visit #10       Latex Allergy:  [x]NO      []YES  Preferred Language for Healthcare:   [x]English       []other:    Visit # Insurance Allowable   6 30     Pain level: 1/10     SUBJECTIVE: pt states the taping is helping with his pain but he is sore today because he went to a [employer] game and had to climb a lot of stairs. OBJECTIVE:   Observation:   Test measurements:      RESTRICTIONS/PRECAUTIONS:     Exercises/Interventions:     Therapeutic Ex Resistance Sets/sec Reps Notes   BIKE  6'     HS strap stretch  30\" 2    gastroc  / soleus strap stretch  30\" 2 Slant board   SLR 1# 3 10    SL hip abduction 1# 3 10    Bridging3 10 With ball squeeze   Prone donkey kick 1# 2 10    TKE blue 3 10    Eccentric leg press 80# 3 10    Step ups, fwd, lateral 6\" 2 10    Calf raises  3 10    SLS airex  30\" 3    Wall sit  30\" 2           Manual Intervention        Knee mobs/PROM       Tib/Fem Mobs       Patella Mobs       Knee distraction short sitting      Dry needling 8'      IA STM 5'   Hamstring, calf   Patellar taping 5'      NMR re-education       [place] Islander/Biofeedback 10/10       G. Med activaiton/sidelying       G. Max Activation/prone       Hip Ext full ROM G.  Activation Bosu Bal and Prop- G Med       Single leg stance/Balance/Prop       Bosu Retro G. Med act                         Therapeutic Exercise and NMR EXR  [x] (44379) Provided verbal/tactile cueing for activities related to strengthening, flexibility, endurance, ROM for improvements in LE, proximal hip, and core control with self care, mobility, lifting, ambulation.  [] (47145) Provided verbal/tactile cueing for activities related to improving balance, coordination, kinesthetic sense, posture, motor skill, proprioception  to assist with LE, proximal hip, and core control in self care, mobility, lifting, ambulation and eccentric single leg control.      NMR and Therapeutic Activities:    [] (90185 or 53119) Provided verbal/tactile cueing for activities related to improving balance, coordination, kinesthetic sense, posture, motor skill, proprioception and motor activation to allow for proper function of core, proximal hip and LE with self care and ADLs  [] (51661) Gait Re-education- Provided training and instruction to the patient for proper LE, core and proximal hip recruitment and positioning and eccentric body weight control with ambulation re-education including up and down stairs     Home Exercise Program:    [x] (72975) Reviewed/Progressed HEP activities related to strengthening, flexibility, endurance, ROM of core, proximal hip and LE for functional self-care, mobility, lifting and ambulation/stair navigation   [] (98960)Reviewed/Progressed HEP activities related to improving balance, coordination, kinesthetic sense, posture, motor skill, proprioception of core, proximal hip and LE for self care, mobility, lifting, and ambulation/stair navigation      Manual Treatments:  PROM / STM / Oscillations-Mobs:  G-I, II, III, IV (PA's, Inf., Post.)  [x] (75820) Provided manual therapy to mobilize LE, proximal hip and/or LS spine soft tissue/joints for the purpose of modulating pain, promoting relaxation,  increasing ROM, reducing/eliminating soft tissue swelling/inflammation/restriction, improving soft tissue extensibility and allowing for proper ROM for normal function with self care, mobility, lifting and ambulation. Dry needling manual therapy: consisted on the placement of 4 needles in the following muscles:  gastroc, fibularis longus. A 50 mm needle was inserted, piston, rotated, and coned to produce intramuscular mobilization. These techniques were used to restore functional range of motion, reduce muscle spasm and induce healing in the corresponding musculature. (69628)  Clean Technique was utilized today while applying Dry needling treatment. The treatment sites where cleaned with 70% solution of  isopropyl alcohol . The PT washed their hands and utilized treatment gloves along with hand  prior to inserting the needles. All needles where removed and discarded in the appropriate sharps container. MD has given verbal and/or written approval for this treatment. Modalities:  Cold pack with TENS x 10'    Charges:  Timed Code Treatment Minutes: 55   Total Treatment Minutes: 65     [] EVAL  [x] QA(98438) x  3   [] IONTO  [] NMR (26930) x      [] VASO  [x] Manual (26221) x  1    [] Other:  [] TA x       [] Mech Traction (78499)  [] ES(attended) (89071)      [x] ES (un) (55183):     GOALS:   Patient stated goal: less pain, return to exercising, walking without pain    Therapist goals for Patient:   Short Term Goals: To be achieved in: 2 weeks  1. Independent in HEP and progression per patient tolerance, in order to prevent re-injury. 2. Patient will have a decrease in pain to facilitate improvement in movement, function, and ADLs as indicated by Functional Deficits. Long Term Goals: To be achieved in: 6 weeks  1. Disability index score of 10% or less for the LEFS to assist with reaching prior level of function.    2. Patient will demonstrate increased AROM to WNL to allow for proper joint functioning as

## 2018-01-01 ENCOUNTER — HOSPITAL ENCOUNTER (OUTPATIENT)
Dept: PHYSICAL THERAPY | Age: 43
Discharge: HOME | End: 2018-01-31
Attending: ORTHOPAEDIC SURGERY | Admitting: ORTHOPAEDIC SURGERY

## 2018-01-04 ENCOUNTER — OFFICE VISIT (OUTPATIENT)
Dept: ORTHOPEDIC SURGERY | Age: 43
End: 2018-01-04

## 2018-01-04 DIAGNOSIS — M17.12 PRIMARY OSTEOARTHRITIS OF LEFT KNEE: Primary | ICD-10-CM

## 2018-01-04 PROCEDURE — 99024 POSTOP FOLLOW-UP VISIT: CPT | Performed by: ORTHOPAEDIC SURGERY

## 2018-01-04 NOTE — PROGRESS NOTES
HISTORY OF PRESENT ILLNESS:   S/P Knee Arthroscopy  The Patient returns today for their postoperative visit after left knee arthroscopy. Pain control has been satisfactory with oral medications. There have been no fevers or chills. PHYSICAL EXAMINATION: Left Knee   Inspection reveals expected swelling. Portal sites are clean and dry. The skin is warm. Range of motion is limited by pain and swelling. There is no calf pain  Homans sign is negative. Examination of the contralateral knee reveals warm skin, range of motion within normal limits, good quadriceps bulk, tone and strength, no tenderness to palpation, stable cruciate and collateral ligaments, and no joint line tenderness. Examination of the Patients Lumbar spine revealsThe skin is warm and dry. There is no swelling, warmth, or erythema. Range of motion is within normal limits. There is no paraspinal or spinous process tenderness. Ipsilateral and contralateral straight leg raising tests are negative. The distal neurovascular exam is grossly intact and symmetric. ASSESSMENT/PLAN:   The patient is doing well after knee arthroscopy. I have recommended ice,judicious use of NSAIDs, and physical therapy to diminish swelling and restore both motion and strength. I cautioned against overusing the knee, and they will schedule a reevaluation in 2 weeks  They verbalized good understanding of the plan.   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

## 2018-01-18 ENCOUNTER — OFFICE VISIT (OUTPATIENT)
Dept: ORTHOPEDIC SURGERY | Age: 43
End: 2018-01-18

## 2018-01-18 DIAGNOSIS — M17.12 PRIMARY OSTEOARTHRITIS OF LEFT KNEE: Primary | ICD-10-CM

## 2018-01-18 PROCEDURE — 99024 POSTOP FOLLOW-UP VISIT: CPT | Performed by: ORTHOPAEDIC SURGERY

## 2018-01-18 PROCEDURE — 20610 DRAIN/INJ JOINT/BURSA W/O US: CPT | Performed by: ORTHOPAEDIC SURGERY

## 2018-02-08 ENCOUNTER — OFFICE VISIT (OUTPATIENT)
Dept: ORTHOPEDIC SURGERY | Age: 43
End: 2018-02-08

## 2018-02-08 DIAGNOSIS — M25.562 ACUTE PAIN OF LEFT KNEE: Primary | ICD-10-CM

## 2018-02-08 PROCEDURE — 99024 POSTOP FOLLOW-UP VISIT: CPT | Performed by: ORTHOPAEDIC SURGERY

## 2018-02-08 NOTE — PROGRESS NOTES
HISTORY OF PRESENT ILLNESS:   S/P Knee Arthroscopy  The Patient returns today for their postoperative visit after left knee arthroscopy. Pain control has been satisfactory with oral medications. There have been no fevers or chills. PHYSICAL EXAMINATION: Left Knee   Inspection reveals expected swelling. Portal sites are clean and dry. The skin is warm. Range of motion is limited by pain and swelling. There is no calf pain  Homans sign is negative. Examination of the contralateral knee reveals warm skin, range of motion within normal limits, good quadriceps bulk, tone and strength, no tenderness to palpation, stable cruciate and collateral ligaments, and no joint line tenderness. Examination of the Patients Lumbar spine revealsThe skin is warm and dry. There is no swelling, warmth, or erythema. Range of motion is within normal limits. There is no paraspinal or spinous process tenderness. Ipsilateral and contralateral straight leg raising tests are negative. The distal neurovascular exam is grossly intact and symmetric. ASSESSMENT/PLAN:   The patient is doing well after knee arthroscopy. I have recommended ice,judicious use of NSAIDs, and physical therapy to diminish swelling and restore both motion and strength. I cautioned against overusing the knee, and they will schedule a reevaluation in after MRI  They verbalized good understanding of the plan.   He's had persistent pain it's gotten significantly worse we injected him upon his last visit did help but he still having the pain he just doesn't have any swelling he's concerned about this he thinks it is getting significantly worse quicker so were to go ahead and MRI this to see why this is deteriorating so quickly and then see him back and discuss options

## 2018-02-12 ENCOUNTER — OFFICE VISIT (OUTPATIENT)
Dept: ORTHOPEDIC SURGERY | Age: 43
End: 2018-02-12

## 2018-02-12 VITALS — BODY MASS INDEX: 32.78 KG/M2 | WEIGHT: 229 LBS | HEIGHT: 70 IN

## 2018-02-12 DIAGNOSIS — G89.29 CHRONIC PAIN OF BOTH KNEES: Primary | ICD-10-CM

## 2018-02-12 DIAGNOSIS — M25.561 CHRONIC PAIN OF BOTH KNEES: Primary | ICD-10-CM

## 2018-02-12 DIAGNOSIS — M25.562 CHRONIC PAIN OF BOTH KNEES: Primary | ICD-10-CM

## 2018-02-12 PROCEDURE — 99024 POSTOP FOLLOW-UP VISIT: CPT | Performed by: ORTHOPAEDIC SURGERY

## 2018-02-12 PROCEDURE — L3170 FOOT PLAS HEEL STABI PRE OTS: HCPCS | Performed by: ORTHOPAEDIC SURGERY

## 2018-02-12 NOTE — PROGRESS NOTES
HISTORY OF PRESENT ILLNESS:   S/P Knee Arthroscopy  The Patient returns today for their postoperative visit after left knee arthroscopy. Pain control has been satisfactory with oral medications. There have been no fevers or chills. PHYSICAL EXAMINATION: Left Knee   Inspection reveals expected swelling. Portal sites are clean and dry. The skin is warm. Range of motion is limited by pain and swelling. There is no calf pain  Homans sign is negative. Examination of the contralateral knee reveals warm skin, range of motion within normal limits, good quadriceps bulk, tone and strength, no tenderness to palpation, stable cruciate and collateral ligaments, and no joint line tenderness. Examination of the Patients Lumbar spine revealsThe skin is warm and dry. There is no swelling, warmth, or erythema. Range of motion is within normal limits. There is no paraspinal or spinous process tenderness. Ipsilateral and contralateral straight leg raising tests are negative. The distal neurovascular exam is grossly intact and symmetric. ASSESSMENT/PLAN:   The patient is doing well after knee arthroscopy. I have recommended ice,judicious use of NSAIDs, and physical therapy to diminish swelling and restore both motion and strength. I cautioned against overusing the knee, and they will schedule a reevaluation in 6-8 weeks  They verbalized good understanding of the plan.   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

## 2021-12-11 ENCOUNTER — APPOINTMENT (OUTPATIENT)
Dept: GENERAL RADIOLOGY | Age: 46
End: 2021-12-11
Payer: COMMERCIAL

## 2021-12-11 ENCOUNTER — HOSPITAL ENCOUNTER (EMERGENCY)
Age: 46
Discharge: ANOTHER HOSPITAL | End: 2021-12-11
Attending: STUDENT IN AN ORGANIZED HEALTH CARE EDUCATION/TRAINING PROGRAM
Payer: COMMERCIAL

## 2021-12-11 VITALS
WEIGHT: 240 LBS | HEIGHT: 70 IN | BODY MASS INDEX: 34.36 KG/M2 | RESPIRATION RATE: 23 BRPM | DIASTOLIC BLOOD PRESSURE: 81 MMHG | SYSTOLIC BLOOD PRESSURE: 113 MMHG | OXYGEN SATURATION: 97 % | TEMPERATURE: 98.1 F | HEART RATE: 77 BPM

## 2021-12-11 DIAGNOSIS — R07.9 CHEST PAIN, UNSPECIFIED TYPE: Primary | ICD-10-CM

## 2021-12-11 LAB
A/G RATIO: 1.6 (ref 1.1–2.2)
ALBUMIN SERPL-MCNC: 4.3 G/DL (ref 3.4–5)
ALP BLD-CCNC: 94 U/L (ref 40–129)
ALT SERPL-CCNC: 36 U/L (ref 10–40)
ANION GAP SERPL CALCULATED.3IONS-SCNC: 10 MMOL/L (ref 3–16)
AST SERPL-CCNC: 21 U/L (ref 15–37)
BASOPHILS ABSOLUTE: 0.1 K/UL (ref 0–0.2)
BASOPHILS RELATIVE PERCENT: 0.7 %
BILIRUB SERPL-MCNC: 0.5 MG/DL (ref 0–1)
BUN BLDV-MCNC: 14 MG/DL (ref 7–20)
CALCIUM SERPL-MCNC: 9.6 MG/DL (ref 8.3–10.6)
CHLORIDE BLD-SCNC: 101 MMOL/L (ref 99–110)
CO2: 26 MMOL/L (ref 21–32)
CREAT SERPL-MCNC: 1.2 MG/DL (ref 0.9–1.3)
D DIMER: <200 NG/ML DDU (ref 0–229)
EKG ATRIAL RATE: 86 BPM
EKG DIAGNOSIS: NORMAL
EKG P AXIS: 60 DEGREES
EKG P-R INTERVAL: 136 MS
EKG Q-T INTERVAL: 364 MS
EKG QRS DURATION: 92 MS
EKG QTC CALCULATION (BAZETT): 435 MS
EKG R AXIS: 20 DEGREES
EKG T AXIS: 31 DEGREES
EKG VENTRICULAR RATE: 86 BPM
EOSINOPHILS ABSOLUTE: 0.1 K/UL (ref 0–0.6)
EOSINOPHILS RELATIVE PERCENT: 1.4 %
GFR AFRICAN AMERICAN: >60
GFR NON-AFRICAN AMERICAN: >60
GLUCOSE BLD-MCNC: 108 MG/DL (ref 70–99)
HCT VFR BLD CALC: 43.4 % (ref 40.5–52.5)
HEMOGLOBIN: 15.1 G/DL (ref 13.5–17.5)
LYMPHOCYTES ABSOLUTE: 1.4 K/UL (ref 1–5.1)
LYMPHOCYTES RELATIVE PERCENT: 14.6 %
MAGNESIUM: 2.1 MG/DL (ref 1.8–2.4)
MCH RBC QN AUTO: 32.7 PG (ref 26–34)
MCHC RBC AUTO-ENTMCNC: 34.7 G/DL (ref 31–36)
MCV RBC AUTO: 94.3 FL (ref 80–100)
MONOCYTES ABSOLUTE: 0.9 K/UL (ref 0–1.3)
MONOCYTES RELATIVE PERCENT: 9.6 %
NEUTROPHILS ABSOLUTE: 6.8 K/UL (ref 1.7–7.7)
NEUTROPHILS RELATIVE PERCENT: 73.7 %
PDW BLD-RTO: 12.6 % (ref 12.4–15.4)
PLATELET # BLD: 395 K/UL (ref 135–450)
PMV BLD AUTO: 6.3 FL (ref 5–10.5)
POTASSIUM REFLEX MAGNESIUM: 3.5 MMOL/L (ref 3.5–5.1)
RBC # BLD: 4.61 M/UL (ref 4.2–5.9)
SODIUM BLD-SCNC: 137 MMOL/L (ref 136–145)
TOTAL PROTEIN: 7 G/DL (ref 6.4–8.2)
TROPONIN: <0.01 NG/ML
WBC # BLD: 9.2 K/UL (ref 4–11)

## 2021-12-11 PROCEDURE — 93005 ELECTROCARDIOGRAM TRACING: CPT | Performed by: STUDENT IN AN ORGANIZED HEALTH CARE EDUCATION/TRAINING PROGRAM

## 2021-12-11 PROCEDURE — 85025 COMPLETE CBC W/AUTO DIFF WBC: CPT

## 2021-12-11 PROCEDURE — 71046 X-RAY EXAM CHEST 2 VIEWS: CPT

## 2021-12-11 PROCEDURE — 93010 ELECTROCARDIOGRAM REPORT: CPT | Performed by: INTERNAL MEDICINE

## 2021-12-11 PROCEDURE — 99284 EMERGENCY DEPT VISIT MOD MDM: CPT

## 2021-12-11 PROCEDURE — 6370000000 HC RX 637 (ALT 250 FOR IP): Performed by: NURSE PRACTITIONER

## 2021-12-11 PROCEDURE — 85379 FIBRIN DEGRADATION QUANT: CPT

## 2021-12-11 PROCEDURE — 80053 COMPREHEN METABOLIC PANEL: CPT

## 2021-12-11 PROCEDURE — 84484 ASSAY OF TROPONIN QUANT: CPT

## 2021-12-11 PROCEDURE — 83735 ASSAY OF MAGNESIUM: CPT

## 2021-12-11 RX ORDER — NITROGLYCERIN 0.4 MG/1
0.4 TABLET SUBLINGUAL EVERY 5 MIN PRN
Status: DISCONTINUED | OUTPATIENT
Start: 2021-12-11 | End: 2021-12-11 | Stop reason: HOSPADM

## 2021-12-11 RX ORDER — ASPIRIN 325 MG
325 TABLET ORAL ONCE
Status: COMPLETED | OUTPATIENT
Start: 2021-12-11 | End: 2021-12-11

## 2021-12-11 RX ADMIN — ASPIRIN 325 MG: 325 TABLET ORAL at 15:37

## 2021-12-11 ASSESSMENT — ENCOUNTER SYMPTOMS
RHINORRHEA: 0
SHORTNESS OF BREATH: 1
COLOR CHANGE: 0
SORE THROAT: 0
ABDOMINAL PAIN: 0

## 2021-12-11 ASSESSMENT — PAIN DESCRIPTION - LOCATION: LOCATION: CHEST

## 2021-12-11 ASSESSMENT — PAIN SCALES - GENERAL: PAINLEVEL_OUTOF10: 6

## 2021-12-11 ASSESSMENT — PAIN DESCRIPTION - PAIN TYPE: TYPE: ACUTE PAIN

## 2021-12-11 ASSESSMENT — PAIN DESCRIPTION - DESCRIPTORS: DESCRIPTORS: DISCOMFORT

## 2021-12-11 ASSESSMENT — HEART SCORE: ECG: 0

## 2021-12-11 NOTE — ED NOTES
Back into chart to print face sheet [facility] said they didn't receive it.      Bernard Worthington  12/11/21 [id number]

## 2021-12-11 NOTE — ED PROVIDER NOTES
[address] ED  EMERGENCY DEPARTMENT ENCOUNTER        Pt Name: Raeann Escobedo  MRN: [id number]  [place] 1975  Date of evaluation: 12/11/2021  Provider: NELIDA Sanders - NELSON  PCP: Jocelyn Rodriguez DO  ED Attending: Venkat Gr DO    CHIEF COMPLAINT       Chief Complaint   Patient presents with    Chest Pain     Pt c/o chest pain x 45min. Has cardiac hx. HISTORY OF PRESENT ILLNESS   (Location/Symptom, Timing/Onset, Context/Setting, Quality, Duration, Modifying Factors, Severity)  Note limiting factors. Raeann Escobedo is a 48 y.o. male for chest pain. Onset was 45 minutes prior to arrival.  Context includes patient reports that he was picking up his hunting equipment from the woods when he was walking back to his truck. Patient states that he developed dizziness and diaphoresis. He did complain of left-sided jaw pain patient did have shortness of breath with exertion as well. Patient has 2 stents in place and follows with Dr. Rossi Alvarado at the [facility]. Alleviating factors include nothing. Aggravating factors include nothing. Pain is 6/10. Nothing has been used for pain today. Nursing Notes were all reviewed and agreed with or any disagreements were addressed  in the HPI. REVIEW OF SYSTEMS  (2-9 systems for level 4, 10 or more for level 5)     Review of Systems   Constitutional: Negative for fever. HENT: Negative for congestion, rhinorrhea and sore throat. Respiratory: Positive for shortness of breath. Cardiovascular: Positive for chest pain. Gastrointestinal: Negative for abdominal pain. Genitourinary: Negative for decreased urine volume and difficulty urinating. Musculoskeletal: Negative for arthralgias and myalgias. Skin: Negative for color change and rash. Neurological: Positive for dizziness. Negative for light-headedness. Psychiatric/Behavioral: Negative for agitation.    All other systems reviewed and are negative. Positivesand Pertinent negatives as per HPI. Except as noted above in the ROS, all other systems were reviewed and negative. PAST MEDICAL HISTORY     Past Medical History:   Diagnosis Date    Coronary artery disease     Hyperlipidemia     Hypertension          SURGICAL HISTORY       Past Surgical History:   Procedure Laterality Date    ANTERIOR CRUCIATE LIGAMENT REPAIR      right    CORONARY ANGIOPLASTY WITH STENT PLACEMENT      X 2    HERNIA REPAIR      X 2    KNEE ARTHROSCOPY      right and left    KNEE ARTHROSCOPY Left 11/21/2017    left knee arthroscopy, partial medial meniscectomy, synovectomy, chondroplasty    VASECTOMY      VASECTOMY REVERSAL           CURRENT MEDICATIONS       Previous Medications    ASPIRIN 81 MG CHEWABLE TABLET    Take 81 mg by mouth    ATORVASTATIN (LIPITOR) 80 MG TABLET    Take 80 mg by mouth nightly     MELOXICAM (MOBIC) 15 MG TABLET    Take 1 tablet by mouth daily    METOPROLOL SUCCINATE (TOPROL XL) 25 MG EXTENDED RELEASE TABLET    Take 25 mg by mouth nightly     NITROGLYCERIN (NITROSTAT) 0.4 MG SL TABLET    Place 0.4 mg under the tongue    VALSARTAN-HYDROCHLOROTHIAZIDE (DIOVAN-HCT) 320-12.5 MG PER TABLET    nightly          ALLERGIES     Patient has no known allergies.     FAMILY HISTORY       Family History   Problem Relation Age of Onset    Coronary Art Dis Father     High Blood Pressure Father          SOCIAL HISTORY       Social History     Socioeconomic History    Marital status:      Spouse name: None    Number of children: None    Years of education: None    Highest education level: None   Occupational History    None   Tobacco Use    Smoking status: Never Smoker    Smokeless tobacco: Current User     Types: Chew   Substance and Sexual Activity    Alcohol use: Yes     Comment: social    Drug use: No    Sexual activity: None   Other Topics Concern    None   Social History Narrative    None     Social Determinants of Health Financial Resource Strain:     Difficulty of Paying Living Expenses: Not on file   Food Insecurity:     Worried About Running Out of Food in the Last Year: Not on file    Avni of Food in the Last Year: Not on file   Transportation Needs:     Lack of Transportation (Medical): Not on file    Lack of Transportation (Non-Medical): Not on file   Physical Activity:     Days of Exercise per Week: Not on file    Minutes of Exercise per Session: Not on file   Stress:     Feeling of Stress : Not on file   Social Connections:     Frequency of Communication with Friends and Family: Not on file    Frequency of Social Gatherings with Friends and Family: Not on file    Attends Druze Services: Not on file    Active Member of [address] [place] [address] [employer] or Organizations: Not on file    Attends Club or Organization Meetings: Not on file    Marital Status: Not on file   Intimate Partner Violence:     Fear of Current or Ex-Partner: Not on file    Emotionally Abused: Not on file    Physically Abused: Not on file    Sexually Abused: Not on file   Housing Stability:     Unable to Pay for Housing in the Last Year: Not on file    Number of [place] in the Last Year: Not on file    Unstable Housing in the Last Year: Not on file       SCREENINGS   NIH Stroke Scale  Interval: Baseline  Level of Consciousness (1a. ): Alert  LOC Questions (1b. ): Answers both correctly  LOC Commands (1c. ): Performs both tasks correctly  Best Gaze (2. ): Normal  Visual (3. ): No visual loss  Facial Palsy (4. ): Normal symmetrical movement  Motor Arm, Left (5a. ): No drift  Motor Arm, Right (5b. ): No drift  Motor Leg, Left (6a. ): No drift  Motor Leg, Right (6b. ): No drift  Limb Ataxia (7. ): Absent  Sensory (8. ): Normal  Dysarthria (10. ): Normal  Extinction and Inattention (11): No abnormality  Heart Score for chest pain patients  History:  Moderately Suspicious  ECG: Normal  Patient Age: > 39 and < 65 years  *Risk factors for Atherosclerotic Performed at:  [facility]  [address],  [place], [place]   Phone [phone]       All other labs were within normal range or not returned as of this dictation. EKG: All EKG's are interpreted by the Emergency Department Physician who either signs or Co-signs this chart in the absence of a cardiologist.  Please see their note for interpretation of EKG. RADIOLOGY:   Chest x-ray interpreted by radiologist for  FINDINGS:   HEART/MEDIASTINUM: The cardiomediastinal silhouette is within normal limits. PLEURA/LUNGS: There are no focal consolidations or pleural effusions. There   is no appreciable pneumothorax. BONES/SOFT TISSUE: No acute abnormality. Interpretation per the Radiologist below, if available at the time of this note:    XR CHEST (2 VW)   Final Result   No radiographic evidence of acute pulmonary disease. No results found. PROCEDURES   Unless otherwise noted below, none     Procedures    CRITICAL CARE TIME   N/A    CONSULTS:  IP CONSULT TO CARDIOLOGY      EMERGENCY DEPARTMENT COURSE and DIFFERENTIAL DIAGNOSIS/MDM:   Vitals:    Vitals:    12/11/21 1414 12/11/21 1444 12/11/21 1513 12/11/21 1555   BP:   120/81    Pulse: 86 87 77 76   Resp: 21 14 18 19   SpO2: 94% 97% 99% 100%   Weight:       Height:           Patient was given the following medications:  Medications   nitroGLYCERIN (NITROSTAT) SL tablet 0.4 mg (has no administration in time range)   aspirin tablet 325 mg (325 mg Oral Given 12/11/21 1537)         Patient was seen and evaluated by myself and Dr. Joan Sharma. Patient here for concerns for dizziness and chest pain. Patient reports that he was collecting his hunting equipment from the woods when he was walking back to his truck. Patient reports that he had a sudden episode of dizziness and diaphoresis. He also complained of chest tightness and pain to his left jaw.   Patient does have a cardiac history with stents in place. On exam he is awake and alert hemodynamically stable nontoxic in appearance. Patient denies any pleuritic chest pain but does have shortness of breath with exertion. He denies any fever nausea vomiting diarrhea. Lab values been reviewed and interpreted. Have a heart score of 4. Patient was provided with aspirin and nitroglycerin in the ED. Consult was placed to his cardiologist group at [facility].  Cardiology at [facility] has accepted the patient for transfer. Patient's care is transferred to [facility] for further evaluation. The patient tolerated their visit well. They were seen and evaluated by the attending physician, Kayli Sanders DO who agreed with the assessment and plan. The patient and / or the family were informed of the results of any tests, a time was given to answer questions, a plan was proposed and they agreed with plan. FINAL IMPRESSION      1. Chest pain, unspecified type          DISPOSITION/PLAN   DISPOSITION        PATIENT REFERRED TO:  No follow-up provider specified.     DISCHARGE MEDICATIONS:  New Prescriptions    No medications on file       DISCONTINUED MEDICATIONS:  Discontinued Medications    No medications on file              (Please note that portions of this note were completed with a voice recognition program.  Efforts were made to edit the dictations but occasionally words are mis-transcribed.)    NELIDA Haines CNP (electronically signed)       NELIDA Haines CNP  12/11/21 4602

## 2021-12-11 NOTE — ED NOTES
[facility] phone service of [address] [facility] [address] Clinical cardilogy on call is  [facility].   Called for Pt's Dr. Alissa Kenney  12/11/21 3065    Someone had called back and said need to call [phone] to get on call cardiology     [phone] - called [phone] to get in touch with cardiology on call at [address] [facility] NP is on phone with the [facility] cardiology       Yulia Fowler  12/11/21 Dagoberto called back w/bed assignment  Room: [address] [place] [address],[address]  Report: [phone]    [phone] - faxed face sheet to [address] [place] [address]  Transport Needs: Monitor, no oxygen, Hep Jim Griselda Octavia  12/11/21 1542    Called transport:    [facility] no units till after 12am  1536 - [facility] 30 mins - 1 hour     Yulia Fowler  12/11/21 1543    [facility] here for transport to [facility]  12/11/21 1607

## 2021-12-11 NOTE — ED PROVIDER NOTES
I independently examined and evaluated Willy Perry. All diagnostic, treatment, and disposition decisions were made by myself in conjunction with the advanced practice provider. For all further details of the patient's emergency department visit, please see the advanced practice provider's documentation. Primary Care Physician: Maria Esther Sánchez DO    History: This is a 48 y.o. male who presents to the Emergency Department with complaint of left-sided chest pain radiation to left jaw, history of multiple stents, follows with [facility] cardiology. Associated with nausea lightheadedness dizziness diaphoresis. Symptoms began as he was walking up a hill removing hunting equipment. Symptoms are improving now but still has some residual abnormal sensation to left side of his neck. Trachea midline no palpable thrill no bruit. No dysphonia. No pain with range of motion neck no pain to palpation. Denies injury. Last cath 2020 December    Physical:     height is 5' 10\" (1.778 m) and weight is 240 lb (108.9 kg). His blood pressure is 134/91 (abnormal) and his pulse is 93. His respiration is 18 and oxygen saturation is 98%. 48 y.o. male   Alert, oriented  Heart regular rate rhythm  Lungs clear  Abdomen soft nontender  No significant lower extremity asymmetry or posterior calf pain    Impression: Cardiac rule out    Plan: Cards consult    EKG Interpretation    The Ekg interpreted by me shows  normal sinus rhythm with a rate of 86  Axis is   Normal  QTc is  normal  Intervals and Durations are unremarkable. ST Segments: normal        CRITICAL CARE: There was a high probability of clinically significant/life threatening deterioration in this patient's condition which required my urgent intervention. Total critical care time was 0 minutes. This excludes any time for separately reportable procedures.        Daniel Patten DO  Emergency Physician        Comment: Please note this report has been produced using speech recognition software and may contain errors related to that system including errors in grammar, punctuation, and spelling, as well as words and phrases that may be inappropriate. If there are any questions or concerns please feel free to contact the dictating provider for clarification.           Ilene Lai,   12/11/21 5888

## 2021-12-11 NOTE — ED NOTES
Pt report called to Sangeetha Mark, at [facility]. [phone]. Pt placed on cardiac monitor and taken from ED via [facility].      Judit Lwo RN  12/11/21 2748